# Patient Record
Sex: MALE | Race: WHITE | NOT HISPANIC OR LATINO | Employment: OTHER | URBAN - METROPOLITAN AREA
[De-identification: names, ages, dates, MRNs, and addresses within clinical notes are randomized per-mention and may not be internally consistent; named-entity substitution may affect disease eponyms.]

---

## 2017-05-30 ENCOUNTER — TELEPHONE (OUTPATIENT)
Dept: PULMONOLOGY | Facility: CLINIC | Age: 67
End: 2017-05-30

## 2017-05-30 NOTE — TELEPHONE ENCOUNTER
Spoke with Frederic with [facility], notified him that pt has not been seen in our clinic. He verbalized understanding.

## 2017-05-30 NOTE — TELEPHONE ENCOUNTER
----- Message from Yareli Rivera sent at 5/30/2017  9:40 AM CDT -----  Contact: Frederic/[facility] [phone] ext [phone]  States that he is calling to add to previous message. States that Doctor's note needs to have stated that pt is unable to have a traditional sleep study that he has to have a titration due to a obstruction to trachea. Please call back at [phone] ext [phone]//thank you acc

## 2017-05-30 NOTE — TELEPHONE ENCOUNTER
----- Message from Aleshia Thomas sent at 5/30/2017  9:32 AM CDT -----  Contact: [employer]   Caller states that pt want to get a full face mask and need a script faxed over for new machine with pressure setting, full face mask & clinical notes ...[phone] fax   ...[phone] phone

## 2017-10-17 ENCOUNTER — IMMUNIZATION (OUTPATIENT)
Dept: FAMILY MEDICINE | Facility: CLINIC | Age: 67
End: 2017-10-17
Payer: MEDICARE

## 2017-10-17 ENCOUNTER — TELEPHONE (OUTPATIENT)
Dept: FAMILY MEDICINE | Facility: CLINIC | Age: 67
End: 2017-10-17

## 2017-10-17 PROCEDURE — G0008 ADMIN INFLUENZA VIRUS VAC: HCPCS | Mod: S$GLB,,, | Performed by: FAMILY MEDICINE

## 2017-10-17 PROCEDURE — 99999 PR PBB SHADOW E&M-EST. PATIENT-LVL II: CPT | Mod: PBBFAC,,,

## 2017-10-17 PROCEDURE — 90662 IIV NO PRSV INCREASED AG IM: CPT | Mod: S$GLB,,, | Performed by: FAMILY MEDICINE

## 2017-10-17 NOTE — TELEPHONE ENCOUNTER
Allergies reviewed with patient  Pt instructed to wait 15 min for side effect  Left deltoid flu shot

## 2018-11-12 ENCOUNTER — OFFICE VISIT (OUTPATIENT)
Dept: OTOLARYNGOLOGY | Facility: CLINIC | Age: 68
End: 2018-11-12
Payer: MEDICARE

## 2018-11-12 ENCOUNTER — PATIENT MESSAGE (OUTPATIENT)
Dept: OTOLARYNGOLOGY | Facility: CLINIC | Age: 68
End: 2018-11-12

## 2018-11-12 VITALS
BODY MASS INDEX: 33.15 KG/M2 | TEMPERATURE: 99 F | WEIGHT: 258.19 LBS | SYSTOLIC BLOOD PRESSURE: 129 MMHG | HEART RATE: 79 BPM | DIASTOLIC BLOOD PRESSURE: 74 MMHG

## 2018-11-12 DIAGNOSIS — G47.33 OSA ON CPAP: ICD-10-CM

## 2018-11-12 DIAGNOSIS — H69.93 ETD (EUSTACHIAN TUBE DYSFUNCTION), BILATERAL: ICD-10-CM

## 2018-11-12 DIAGNOSIS — R09.81 NASAL CONGESTION: ICD-10-CM

## 2018-11-12 DIAGNOSIS — J32.9 CHRONIC SINUSITIS, UNSPECIFIED LOCATION: Primary | ICD-10-CM

## 2018-11-12 DIAGNOSIS — H72.91 TYMPANIC MEMBRANE PERFORATION, RIGHT: ICD-10-CM

## 2018-11-12 PROCEDURE — 3078F DIAST BP <80 MM HG: CPT | Mod: CPTII,S$GLB,, | Performed by: ORTHOPAEDIC SURGERY

## 2018-11-12 PROCEDURE — 3074F SYST BP LT 130 MM HG: CPT | Mod: CPTII,S$GLB,, | Performed by: ORTHOPAEDIC SURGERY

## 2018-11-12 PROCEDURE — 99999 PR PBB SHADOW E&M-EST. PATIENT-LVL IV: CPT | Mod: PBBFAC,,, | Performed by: ORTHOPAEDIC SURGERY

## 2018-11-12 PROCEDURE — 99204 OFFICE O/P NEW MOD 45 MIN: CPT | Mod: S$GLB,,, | Performed by: ORTHOPAEDIC SURGERY

## 2018-11-12 RX ORDER — MUPIROCIN 20 MG/G
OINTMENT TOPICAL 2 TIMES DAILY
Qty: 15 G | Refills: 3 | Status: SHIPPED | OUTPATIENT
Start: 2018-11-12 | End: 2018-11-22

## 2018-11-12 RX ORDER — MELOXICAM 15 MG/1
15 TABLET ORAL DAILY
COMMUNITY

## 2018-11-12 NOTE — PROGRESS NOTES
"Subjective:       Patient ID: Jeb Michele is a 68 y.o. male.    Chief Complaint: Sinusitis (sinus infection and nose bleed from left side of nostril [id number].)    Patient is a very pleasant 68 year old gentleman here to see me today for the first time for evaluation of his right ear.  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 Acuna.  He has recently noted increased tinnitus and cracking in his ears.  He denies any ear pain or ear drainage.  He has hearing loss, no recent change in hearing.  He has not had any recent change in his hearing.  Following the encephalocele repair he required multiple sets of PET's. He says that since 2015 he has had a "constant sinus infection."  He says that he is constantly blowing thick yellow and bloody nasal drainage.  He has been on antibiotics at least four to five times over the last year with his PCP, he has not seen ENT in several years.  He has not had any recent imaging.  He does not smoke.  He is not currently using any Flonase or nasal irrigations.  He says that he was on Flonase many years ago, but there was concern that it was elevating his BG, so they stopped.  He also has issues with spontaneous nose bleeds, several times over the last two weeks.  He does wear a CPAP at night, not currently humidified.      Review of Systems   Constitutional: Negative for fatigue, fever and unexpected weight change.   HENT: Positive for congestion, hearing loss, postnasal drip, rhinorrhea, sinus pressure and tinnitus. Negative for ear discharge, ear pain, facial swelling, nosebleeds, sneezing, sore throat, trouble swallowing and voice change.    Eyes: Negative for discharge, redness and itching.   Respiratory: Positive for cough. Negative for choking, shortness of breath and wheezing.    Cardiovascular: Negative for chest pain and palpitations.   Gastrointestinal: Negative for abdominal pain.        No reflux.   Musculoskeletal: Negative " for neck pain.   Allergic/Immunologic: Positive for environmental allergies.   Neurological: Positive for headaches. Negative for dizziness, facial asymmetry and light-headedness.   Hematological: Negative for adenopathy. Does not bruise/bleed easily.   Psychiatric/Behavioral: Negative for agitation, behavioral problems, confusion and decreased concentration.       Objective:      Physical Exam   Constitutional: He is oriented to person, place, and time. Vital signs are normal. He appears well-developed and well-nourished. No distress.   HENT:   Head: Normocephalic and atraumatic.   Right Ear: Hearing, external ear and ear canal normal. Tympanic membrane is perforated. No middle ear effusion.   Left Ear: Hearing, external ear and ear canal normal. Tympanic membrane is scarred and retracted.  No middle ear effusion.   Nose: Mucosal edema and rhinorrhea present. No nasal deformity or septal deviation.   Mouth/Throat: Uvula is midline, oropharynx is clear and moist and mucous membranes are normal. No trismus in the jaw. Normal dentition. No uvula swelling (previous UP3). No oropharyngeal exudate or posterior oropharyngeal edema.   Eyes: Conjunctivae and EOM are normal. Pupils are equal, round, and reactive to light. Right eye exhibits no chemosis. Left eye exhibits no chemosis. Right conjunctiva is not injected. Left conjunctiva is not injected. No scleral icterus.   Neck: Trachea normal and phonation normal. No tracheal tenderness present. No tracheal deviation present. No thyroid mass and no thyromegaly present.   Cardiovascular: Intact distal pulses.   Pulmonary/Chest: Effort normal. No accessory muscle usage or stridor. No respiratory distress.   Lymphadenopathy:        Head (right side): No submental, no submandibular, no preauricular and no posterior auricular adenopathy present.        Head (left side): No submental, no submandibular, no preauricular and no posterior auricular adenopathy present.     He has no  cervical adenopathy.        Right cervical: No superficial cervical and no deep cervical adenopathy present.       Left cervical: No superficial cervical and no deep cervical adenopathy present.   Neurological: He is alert and oriented to person, place, and time. No cranial nerve deficit.   Skin: Skin is warm and dry. No rash noted. No erythema.   Psychiatric: He has a normal mood and affect. His behavior is normal. Thought content normal.       Assessment:       1. Chronic sinusitis, unspecified location    2. MARJORIE on CPAP    3. Nasal congestion    4. Tympanic membrane perforation, right    5. ETD (Eustachian tube dysfunction), bilateral        Plan:       1.  Chronic sinusitis:  He does have signs and symptoms of chronic sinusitis, and does have a previous history of nasal antral windows placed in sinus surgery. At this point, I would recommend a CT scan of his sinuses prior to initiating any additional antibiotic therapy.  Will schedule CT, and have him return to clinic following to review.  2.  MARJORIE on CPAP:  Discussed importance of humidification to help decrease his nasal drainage.  I would recommend he begin to use the the humidification feature of the CPAP, and also use saline spray during the day.  3.  Nasal congestion:  As above.  May consider read trying Flonase in the future, he reported a transient increase in his blood glucose with the use of Flonase, though that is not a generally seen side effect.  4.  Tympanic membrane perforation, right:  Stable at this time, with no active drainage.  I would not recommend any intervention as he has required multiple sets of ear tubes.

## 2018-11-13 ENCOUNTER — HOSPITAL ENCOUNTER (OUTPATIENT)
Dept: RADIOLOGY | Facility: HOSPITAL | Age: 68
Discharge: HOME | End: 2018-11-13
Attending: ORTHOPAEDIC SURGERY
Payer: MEDICARE

## 2018-11-13 ENCOUNTER — PATIENT MESSAGE (OUTPATIENT)
Dept: OTOLARYNGOLOGY | Facility: CLINIC | Age: 68
End: 2018-11-13

## 2018-11-13 DIAGNOSIS — J32.9 CHRONIC SINUSITIS, UNSPECIFIED LOCATION: ICD-10-CM

## 2018-11-13 PROCEDURE — 70486 CT MAXILLOFACIAL W/O DYE: CPT | Mod: TC

## 2018-11-14 ENCOUNTER — OFFICE VISIT (OUTPATIENT)
Dept: OTOLARYNGOLOGY | Facility: CLINIC | Age: 68
End: 2018-11-14
Payer: MEDICARE

## 2018-11-14 VITALS
DIASTOLIC BLOOD PRESSURE: 77 MMHG | HEART RATE: 74 BPM | HEIGHT: 74 IN | SYSTOLIC BLOOD PRESSURE: 165 MMHG | BODY MASS INDEX: 33.1 KG/M2 | WEIGHT: 257.94 LBS

## 2018-11-14 DIAGNOSIS — G47.33 OSA ON CPAP: ICD-10-CM

## 2018-11-14 DIAGNOSIS — R04.0 EPISTAXIS: Primary | ICD-10-CM

## 2018-11-14 DIAGNOSIS — J32.9 CHRONIC SINUSITIS, UNSPECIFIED LOCATION: ICD-10-CM

## 2018-11-14 DIAGNOSIS — I10 HYPERTENSION, UNSPECIFIED TYPE: ICD-10-CM

## 2018-11-14 PROCEDURE — 1101F PT FALLS ASSESS-DOCD LE1/YR: CPT | Mod: CPTII,S$GLB,, | Performed by: ORTHOPAEDIC SURGERY

## 2018-11-14 PROCEDURE — 3077F SYST BP >= 140 MM HG: CPT | Mod: CPTII,S$GLB,, | Performed by: ORTHOPAEDIC SURGERY

## 2018-11-14 PROCEDURE — 99214 OFFICE O/P EST MOD 30 MIN: CPT | Mod: 25,S$GLB,, | Performed by: ORTHOPAEDIC SURGERY

## 2018-11-14 PROCEDURE — 3078F DIAST BP <80 MM HG: CPT | Mod: CPTII,S$GLB,, | Performed by: ORTHOPAEDIC SURGERY

## 2018-11-14 PROCEDURE — 99999 PR PBB SHADOW E&M-EST. PATIENT-LVL IV: CPT | Mod: PBBFAC,,, | Performed by: ORTHOPAEDIC SURGERY

## 2018-11-14 PROCEDURE — 31237 NSL/SINS NDSC SURG BX POLYPC: CPT | Mod: LT,S$GLB,, | Performed by: ORTHOPAEDIC SURGERY

## 2018-11-14 RX ORDER — DOCUSATE SODIUM 100 MG/1
100 CAPSULE, LIQUID FILLED ORAL
COMMUNITY
End: 2019-02-25

## 2018-11-14 RX ORDER — AMLODIPINE BESYLATE 5 MG/1
5 TABLET ORAL DAILY
Refills: 3 | COMMUNITY
Start: 2018-10-19

## 2018-11-14 RX ORDER — CEFDINIR 300 MG/1
300 CAPSULE ORAL 2 TIMES DAILY
Qty: 20 CAPSULE | Refills: 0 | Status: SHIPPED | OUTPATIENT
Start: 2018-11-14 | End: 2018-11-24

## 2018-11-14 RX ORDER — TAMSULOSIN HYDROCHLORIDE 0.4 MG/1
0.4 CAPSULE ORAL DAILY
COMMUNITY
Start: 2018-09-05

## 2018-11-14 RX ORDER — QUINAPRIL 20 MG/1
20 TABLET ORAL 2 TIMES DAILY
COMMUNITY
Start: 2016-08-18

## 2018-11-14 NOTE — PROGRESS NOTES
Subjective:       Patient ID: Jeb Michele is a 68 y.o. male.    Chief Complaint: Other (nose bleed seen in ER last night)    Patient is a very pleasant 68 year old gentleman here to see me today in followup for evaluation of sinusitis.  He had a CT of the sinuses yesterday, and then went home and had a significant issue with epistaxis.  He was seen at [facility] ER, and his bleeding generally stopped with pressure and Afrin.  He is on ASA daily as a preventative medication.  He wears CPAP at night, but he did not wear last night.  He has hypertension, but his BP is generally fairly well controlled.  At his last visit, we discussed using saline spray to help increase his nasal moisture.  He is not currently on Flonase, he says that he tried to take in the past but he stopped as there was concern it was elevating his BG slightly.  I saw him on Monday, and he did use humidification that night with his CPAP, and reports that he slept better than he usually does.      Review of Systems   Constitutional: Negative for fatigue, fever and unexpected weight change.   HENT: Positive for congestion, hearing loss, postnasal drip, rhinorrhea, sinus pressure and tinnitus. Negative for ear discharge, ear pain, facial swelling, nosebleeds, sneezing, sore throat, trouble swallowing and voice change.    Eyes: Negative for discharge, redness and itching.   Respiratory: Positive for cough. Negative for choking, shortness of breath and wheezing.    Cardiovascular: Negative for chest pain and palpitations.   Gastrointestinal: Negative for abdominal pain.        No reflux.   Musculoskeletal: Negative for neck pain.   Allergic/Immunologic: Positive for environmental allergies.   Neurological: Positive for headaches. Negative for dizziness, facial asymmetry and light-headedness.   Hematological: Negative for adenopathy. Does not bruise/bleed easily.   Psychiatric/Behavioral: Negative for agitation, behavioral problems, confusion and  decreased concentration.       Objective:      Physical Exam   Constitutional: He is oriented to person, place, and time. Vital signs are normal. He appears well-developed and well-nourished. No distress.   HENT:   Head: Normocephalic and atraumatic.   Right Ear: Hearing, external ear and ear canal normal. Tympanic membrane is perforated. No middle ear effusion.   Left Ear: Hearing, external ear and ear canal normal. Tympanic membrane is scarred and retracted.  No middle ear effusion.   Nose: Nose normal. No mucosal edema, rhinorrhea, nasal deformity or septal deviation.   Mouth/Throat: Uvula is midline, oropharynx is clear and moist and mucous membranes are normal. No trismus in the jaw. Normal dentition. No uvula swelling. No oropharyngeal exudate or posterior oropharyngeal edema.   Eyes: Conjunctivae and EOM are normal. Pupils are equal, round, and reactive to light. Right eye exhibits no chemosis. Left eye exhibits no chemosis. Right conjunctiva is not injected. Left conjunctiva is not injected. No scleral icterus.   Neck: Trachea normal and phonation normal. No tracheal tenderness present. No tracheal deviation present. No thyroid mass and no thyromegaly present.   Cardiovascular: Intact distal pulses.   Pulmonary/Chest: Effort normal. No accessory muscle usage or stridor. No respiratory distress.   Lymphadenopathy:        Head (right side): No submental, no submandibular, no preauricular and no posterior auricular adenopathy present.        Head (left side): No submental, no submandibular, no preauricular and no posterior auricular adenopathy present.     He has no cervical adenopathy.        Right cervical: No superficial cervical and no deep cervical adenopathy present.       Left cervical: No superficial cervical and no deep cervical adenopathy present.   Neurological: He is alert and oriented to person, place, and time. No cranial nerve deficit.   Skin: Skin is warm and dry. No rash noted. No erythema.    Psychiatric: He has a normal mood and affect. His behavior is normal. Thought content normal.       PROCEDURE NOTE:  Nasal endoscopy and debridement  Preprocedure diagnosis:  Chronic sinusitis  Postprocedure diangosis:  Same  Complications:  None  Blood Loss:  None    Procedure in detail:  After verbal consent was obtained, the patient's nasal cavity was anesthesized using topical Tetracaine and Neosynepherine.  A rigid 30 degree endoscope was placed in first the right, then the left nasal cavity.  The inferior and middle turbinates were examined, and found to be normal bilaterally.  The middle meatus and maxillary antrum was also examined, and found to be normal bilaterally.  He had bilateral nasal-antral windows, widely patent on the right with no drainage.  On the left, I was unable to fully pass the suction into his maxillary sinus, but I was able to suction copious purulent debris from the narrow opening.  Silver nitrate applied to the head of his left middle turbinate and the left nasal septum.  The patient tolerated the procedure well and there were no complications.      CT SINUS:  Images and report both reviewed with patient.    1. Chronic bilateral maxillary sinusitis as detailed above.  2. Slight rightward nasal septal deviation.  3. Gem bullosa of the left middle turbinates.  4. Mucosal hypertrophy of the left inferior turbinate.  All CT scans at this facility are performed  using dose modulation techniques as appropriate to performed exam including the following:  automated exposure control; adjustment of mA and/or kV according to the patients size (this includes techniques or standardized protocols for targeted exams where dose is matched to indication/reason for exam: i.e. extremities or head);  iterative reconstruction technique.      Assessment:       1. Epistaxis    2. Hypertension, unspecified type    3. MARJORIE on CPAP    4. Chronic sinusitis, unspecified location        Plan:       1.   Epistaxis:  On examination today he did have some active bleeding which was cauterized using silver nitrate, as well as an acute sinusitis of the left maxillary sinus.  I would recommend he continue with Bactroban ointment to his bilateral nostrils twice daily as well as saline spray during the day.  Okay to use Afrin as needed for active bleeding.  2.  Hypertension:  Continue with aggressive blood pressure management, discussed correlation between epistaxis and elevated blood pressure.  3.  MARJORIE on CPAP:  Resume use of CPAP machine with humidification.  Call if this triggers any further epistaxis.  4.  Chronic sinusitis:  Reviewed CT scan as well as today's exam findings with patient, patient's started on oral Omnicef.  He does have a history of penicillin allergy, which he said he was told from his mother as a young child, he will call if he has any side effects from that medication.  Return to clinic in 1 month to review progress.  At that point, may consider a repeat trial of Flonase as that likely would help with many of his symptoms, that we need for his epistaxis to be resolved prior to starting that medication.

## 2018-11-14 NOTE — TELEPHONE ENCOUNTER
Patient sent you a message on Monday, 11/12/18, which was forwarded to you by Karina.  I have copied it below.  Please respond to the patient via [facility].  Thanks.    Jeb Michele   to Yareli Fitzgerald MD           5:45 PM   The paper I got with this  mupirocin said it was not to be used intranasal. It said there was another ointment, mupirocin calcium nasal ointment. I understood you to say that it was to be used intranasal, is that correct?

## 2018-11-16 ENCOUNTER — TELEPHONE (OUTPATIENT)
Dept: OTOLARYNGOLOGY | Facility: CLINIC | Age: 68
End: 2018-11-16

## 2018-11-16 RX ORDER — LEVOFLOXACIN 500 MG/1
500 TABLET, FILM COATED ORAL DAILY
Qty: 14 TABLET | Refills: 0 | Status: SHIPPED | OUTPATIENT
Start: 2018-11-16 | End: 2018-11-30

## 2018-11-16 NOTE — TELEPHONE ENCOUNTER
Spoke with pt and is having dizziness and diarrhea from the antibiotic and is wondering if there is an alternative that can be sent in.

## 2018-11-16 NOTE — TELEPHONE ENCOUNTER
----- Message from Carolyn Aguirre sent at 11/16/2018  8:00 AM CST -----  states that he's having reaction to cefdinir (dizziness & diarrhea). pls adv...[phone]

## 2018-11-16 NOTE — TELEPHONE ENCOUNTER
Spoke with pt and advised him to stop the Cefdinir and begin the Levaquin.  Pt verbalized understanding.

## 2018-11-26 ENCOUNTER — OFFICE VISIT (OUTPATIENT)
Dept: OTOLARYNGOLOGY | Facility: CLINIC | Age: 68
End: 2018-11-26
Payer: MEDICARE

## 2018-11-26 VITALS
TEMPERATURE: 98 F | HEIGHT: 74 IN | SYSTOLIC BLOOD PRESSURE: 118 MMHG | DIASTOLIC BLOOD PRESSURE: 72 MMHG | BODY MASS INDEX: 33.13 KG/M2 | WEIGHT: 258.19 LBS | HEART RATE: 75 BPM

## 2018-11-26 DIAGNOSIS — H72.91 TYMPANIC MEMBRANE PERFORATION, RIGHT: ICD-10-CM

## 2018-11-26 DIAGNOSIS — G47.33 OSA ON CPAP: ICD-10-CM

## 2018-11-26 DIAGNOSIS — R04.0 EPISTAXIS: Primary | ICD-10-CM

## 2018-11-26 DIAGNOSIS — J32.9 CHRONIC SINUSITIS, UNSPECIFIED LOCATION: ICD-10-CM

## 2018-11-26 PROCEDURE — 30901 CONTROL OF NOSEBLEED: CPT | Mod: LT,S$GLB,, | Performed by: ORTHOPAEDIC SURGERY

## 2018-11-26 PROCEDURE — 3074F SYST BP LT 130 MM HG: CPT | Mod: CPTII,S$GLB,, | Performed by: ORTHOPAEDIC SURGERY

## 2018-11-26 PROCEDURE — 3078F DIAST BP <80 MM HG: CPT | Mod: CPTII,S$GLB,, | Performed by: ORTHOPAEDIC SURGERY

## 2018-11-26 PROCEDURE — 99999 PR PBB SHADOW E&M-EST. PATIENT-LVL IV: CPT | Mod: PBBFAC,,, | Performed by: ORTHOPAEDIC SURGERY

## 2018-11-26 PROCEDURE — 99214 OFFICE O/P EST MOD 30 MIN: CPT | Mod: 25,S$GLB,, | Performed by: ORTHOPAEDIC SURGERY

## 2018-11-26 PROCEDURE — 1101F PT FALLS ASSESS-DOCD LE1/YR: CPT | Mod: CPTII,S$GLB,, | Performed by: ORTHOPAEDIC SURGERY

## 2018-11-26 NOTE — PROGRESS NOTES
Subjective:       Patient ID: Jeb Michele is a 68 y.o. male.    Chief Complaint: Follow-up (2wk)    Patient is a very pleasant 68 year old gentleman here to see me today in followup for evaluation of sinusitis and epistaxis.  He has a remote history of sinus surgery. He also does wear a CPAP at night, and has recently added humidification.  At his last visit, his scope examination was consistent with an acute sinusitis I started him on oral Levaquin which did not tolerate and was then adjusted to Omnicef.  Overall, he feels that he has had a significant improvement in his symptoms, is extremely pleased with his progress.  He feels that he is sleeping much better at night with the addition of humidification to his CPAP, and is not waking up with dry mouth.  He did have 1 episode of epistaxis this morning from his left nostril, his 1st since his last visit with us.  He has hypertension, but his BP is generally fairly well controlled.  He is not currently on Flonase, he says that he tried to take in the past but he stopped as there was concern it was elevating his BG slightly.      Review of Systems   Constitutional: Negative for fatigue, fever and unexpected weight change.   HENT: Positive for congestion, hearing loss, postnasal drip and tinnitus. Negative for ear discharge, ear pain, facial swelling, nosebleeds, rhinorrhea, sinus pressure, sneezing, sore throat, trouble swallowing and voice change.    Eyes: Negative for discharge, redness and itching.   Respiratory: Positive for cough. Negative for choking, shortness of breath and wheezing.    Cardiovascular: Negative for chest pain and palpitations.   Gastrointestinal: Negative for abdominal pain.        No reflux.   Musculoskeletal: Negative for neck pain.   Allergic/Immunologic: Positive for environmental allergies.   Neurological: Positive for headaches. Negative for dizziness, facial asymmetry and light-headedness.   Hematological: Negative for adenopathy.  Does not bruise/bleed easily.   Psychiatric/Behavioral: Negative for agitation, behavioral problems, confusion and decreased concentration.       Objective:      Physical Exam   Constitutional: He is oriented to person, place, and time. Vital signs are normal. He appears well-developed and well-nourished. No distress.   HENT:   Head: Normocephalic and atraumatic.   Right Ear: Hearing, external ear and ear canal normal. Tympanic membrane is perforated. No middle ear effusion.   Left Ear: Hearing, external ear and ear canal normal. Tympanic membrane is scarred and retracted.  No middle ear effusion.   Nose: Nose normal. No mucosal edema, rhinorrhea, nasal deformity or septal deviation.   Mouth/Throat: Uvula is midline, oropharynx is clear and moist and mucous membranes are normal. No trismus in the jaw. Normal dentition. No uvula swelling. No oropharyngeal exudate or posterior oropharyngeal edema.   Active bleeding left anterior nasal septum, cauterization described below   Eyes: Conjunctivae and EOM are normal. Pupils are equal, round, and reactive to light. Right eye exhibits no chemosis. Left eye exhibits no chemosis. Right conjunctiva is not injected. Left conjunctiva is not injected. No scleral icterus.   Neck: Trachea normal and phonation normal. No tracheal tenderness present. No tracheal deviation present. No thyroid mass and no thyromegaly present.   Cardiovascular: Intact distal pulses.   Pulmonary/Chest: Effort normal. No accessory muscle usage or stridor. No respiratory distress.   Lymphadenopathy:        Head (right side): No submental, no submandibular, no preauricular and no posterior auricular adenopathy present.        Head (left side): No submental, no submandibular, no preauricular and no posterior auricular adenopathy present.     He has no cervical adenopathy.        Right cervical: No superficial cervical and no deep cervical adenopathy present.       Left cervical: No superficial cervical and no  deep cervical adenopathy present.   Neurological: He is alert and oriented to person, place, and time. No cranial nerve deficit.   Skin: Skin is warm and dry. No rash noted. No erythema.   Psychiatric: He has a normal mood and affect. His behavior is normal. Thought content normal.       PROCEDURE NOTE:  Control of Anterior Epistaxis  Preprocedure diagnosis:  Recurrent epistaxis  Postprocedure diangosis:  Same  Complications:  None  Blood Loss:  Minimal    Procedure in detail:  After verbal consent was obtained, the patient's nasal cavity was anesthesized using topical Ponticaine.  Upon examination, the patient had ectatic vessels on his anterior septum, on the left side.  Under direct visualization with a head lamp and a nasal speculum, a silver nitrate stick was appiled to his left anterior septum.  A minimal amount of bleeding was noted.  The patient tolerated the procedure well, and there were no significant complications.          Assessment:       1. Epistaxis    2. MARJORIE on CPAP    3. Chronic sinusitis, unspecified location    4. Tympanic membrane perforation, right        Plan:       1.  Epistaxis:  Repeat cautery today, overall significant improvement in the frequency of his episodes from previously.  Continue with the ointment and saline spray as needed.  2.  MARJORIE on CPAP:  He has noted a significant improvement in his symptoms adding humidification to his CPAP machine.  3.  Chronic sinusitis:  Now resolved with oral Omnicef.  4.  Right tympanic membrane perforation:  We again reviewed that he does have a perforation of his right tympanic membrane, with his history of longstanding eustachian tube dysfunction is actually functioning as a tube at this point.  He does not have any recent audiograms, and I would recommend a baseline audiogram on his return visit in 6 months.  Will call for sooner appointment if he notes any issues in the interim.

## 2019-02-25 ENCOUNTER — OFFICE VISIT (OUTPATIENT)
Dept: OTOLARYNGOLOGY | Facility: CLINIC | Age: 69
End: 2019-02-25
Payer: MEDICARE

## 2019-02-25 VITALS
HEART RATE: 80 BPM | BODY MASS INDEX: 33.17 KG/M2 | DIASTOLIC BLOOD PRESSURE: 66 MMHG | WEIGHT: 258.38 LBS | TEMPERATURE: 98 F | SYSTOLIC BLOOD PRESSURE: 127 MMHG

## 2019-02-25 DIAGNOSIS — H72.91 TYMPANIC MEMBRANE PERFORATION, RIGHT: ICD-10-CM

## 2019-02-25 DIAGNOSIS — G47.33 OSA ON CPAP: ICD-10-CM

## 2019-02-25 DIAGNOSIS — J32.9 CHRONIC SINUSITIS, UNSPECIFIED LOCATION: ICD-10-CM

## 2019-02-25 DIAGNOSIS — R04.0 EPISTAXIS: Primary | ICD-10-CM

## 2019-02-25 PROCEDURE — 30901 PR CTRL 2SEBLEED,ANTER,SIMPLE: ICD-10-PCS | Mod: LT,S$GLB,, | Performed by: ORTHOPAEDIC SURGERY

## 2019-02-25 PROCEDURE — 99214 OFFICE O/P EST MOD 30 MIN: CPT | Mod: 25,S$GLB,, | Performed by: ORTHOPAEDIC SURGERY

## 2019-02-25 PROCEDURE — 1101F PT FALLS ASSESS-DOCD LE1/YR: CPT | Mod: CPTII,S$GLB,, | Performed by: ORTHOPAEDIC SURGERY

## 2019-02-25 PROCEDURE — 99214 PR OFFICE/OUTPT VISIT, EST, LEVL IV, 30-39 MIN: ICD-10-PCS | Mod: 25,S$GLB,, | Performed by: ORTHOPAEDIC SURGERY

## 2019-02-25 PROCEDURE — 30901 CONTROL OF NOSEBLEED: CPT | Mod: LT,S$GLB,, | Performed by: ORTHOPAEDIC SURGERY

## 2019-02-25 PROCEDURE — 1101F PR PT FALLS ASSESS DOC 0-1 FALLS W/OUT INJ PAST YR: ICD-10-PCS | Mod: CPTII,S$GLB,, | Performed by: ORTHOPAEDIC SURGERY

## 2019-02-25 PROCEDURE — 99999 PR PBB SHADOW E&M-EST. PATIENT-LVL III: ICD-10-PCS | Mod: PBBFAC,,, | Performed by: ORTHOPAEDIC SURGERY

## 2019-02-25 PROCEDURE — 99999 PR PBB SHADOW E&M-EST. PATIENT-LVL III: CPT | Mod: PBBFAC,,, | Performed by: ORTHOPAEDIC SURGERY

## 2019-02-25 RX ORDER — HYDROCHLOROTHIAZIDE 12.5 MG/1
12.5 CAPSULE ORAL DAILY
COMMUNITY
Start: 2019-02-16 | End: 2019-02-25 | Stop reason: SDUPTHER

## 2019-02-25 RX ORDER — CEFDINIR 300 MG/1
300 CAPSULE ORAL 2 TIMES DAILY
Qty: 20 CAPSULE | Refills: 0 | Status: SHIPPED | OUTPATIENT
Start: 2019-02-25 | End: 2019-03-07

## 2019-02-25 RX ORDER — MUPIROCIN 20 MG/G
OINTMENT TOPICAL 2 TIMES DAILY
Qty: 15 G | Refills: 3 | Status: SHIPPED | OUTPATIENT
Start: 2019-02-25 | End: 2020-01-09

## 2019-02-25 NOTE — PROGRESS NOTES
Subjective:       Patient ID: Jeb Michele is a 68 y.o. male.    Chief Complaint: Sinusitis; Sinus Problem; Other (left side of face swollen for 2 weeks); and Epistaxis (severe for 2 days; last nosebleed 2/24/2019 at 1pm)    Patient is very pleasant 68 year old gentleman here to see me today in followup for evaluation of persistent epistaxis.  He says that the left side is bleeding more than the right, and he has had some facial swelling of the left side of his face.  He generally feels run down.  He has thick green nasal drainage from the right side of his nose.  He has just completed a course of antibiotics with his PCP, azithromycin.  He has had a CT showing mild to moderate chronic sinusitis, no active infection.  He is ASA daily, 81 mg.  He is on CPAP, it is humidified.      Review of Systems   Constitutional: Positive for fatigue. Negative for fever and unexpected weight change.   HENT: Positive for congestion, postnasal drip, rhinorrhea and sinus pressure. Negative for ear discharge, ear pain, facial swelling, hearing loss, nosebleeds, sneezing, sore throat, tinnitus, trouble swallowing and voice change.    Eyes: Negative for discharge, redness and itching.   Respiratory: Negative for cough, choking, shortness of breath and wheezing.    Cardiovascular: Negative for chest pain and palpitations.   Gastrointestinal: Negative for abdominal pain.        No reflux.   Musculoskeletal: Negative for neck pain.   Allergic/Immunologic: Positive for environmental allergies.   Neurological: Positive for light-headedness and headaches. Negative for dizziness and facial asymmetry.   Hematological: Negative for adenopathy. Does not bruise/bleed easily.   Psychiatric/Behavioral: Negative for agitation, behavioral problems, confusion and decreased concentration.       Objective:      Physical Exam   Constitutional: He is oriented to person, place, and time. Vital signs are normal. He appears well-developed and  well-nourished. No distress.   HENT:   Head: Normocephalic and atraumatic.   Right Ear: Hearing, external ear and ear canal normal. Tympanic membrane is perforated. No middle ear effusion.   Left Ear: Hearing, external ear and ear canal normal. Tympanic membrane is scarred and retracted.  No middle ear effusion.   Nose: Mucosal edema and rhinorrhea (thick, dry drainage bilaterally) present. No nasal deformity or septal deviation.   Mouth/Throat: Uvula is midline, oropharynx is clear and moist and mucous membranes are normal. No trismus in the jaw. Normal dentition. No uvula swelling. No oropharyngeal exudate or posterior oropharyngeal edema.   Ectatic vessel left anterior nasal septum, cauterization described below   Eyes: Conjunctivae and EOM are normal. Pupils are equal, round, and reactive to light. Right eye exhibits no chemosis. Left eye exhibits no chemosis. Right conjunctiva is not injected. Left conjunctiva is not injected. No scleral icterus.   Neck: Trachea normal and phonation normal. No tracheal tenderness present. No tracheal deviation present. No thyroid mass and no thyromegaly present.   Cardiovascular: Intact distal pulses.   Pulmonary/Chest: Effort normal. No accessory muscle usage or stridor. No respiratory distress.   Lymphadenopathy:        Head (right side): No submental, no submandibular, no preauricular and no posterior auricular adenopathy present.        Head (left side): No submental, no submandibular, no preauricular and no posterior auricular adenopathy present.     He has no cervical adenopathy.        Right cervical: No superficial cervical and no deep cervical adenopathy present.       Left cervical: No superficial cervical and no deep cervical adenopathy present.   Neurological: He is alert and oriented to person, place, and time. No cranial nerve deficit.   Skin: Skin is warm and dry. No rash noted. No erythema.   Psychiatric: He has a normal mood and affect. His behavior is normal.  Thought content normal.       PROCEDURE NOTE:  Control of Anterior Epistaxis  Preprocedure diagnosis:  Recurrent epistaxis  Postprocedure diangosis:  Same  Complications:  None  Blood Loss:  Minimal    Procedure in detail:  After verbal consent was obtained, the patient's nasal cavity was examined with a headlight.  Upon examination, the patient had ectatic vessels on his anterior septum, on the left side.  Under direct visualization with a head lamp and a nasal speculum, a silver nitrate stick was appiled to his left anterior septum.  A minimal amount of bleeding was noted.  The patient tolerated the procedure well, and there were no significant complications.          Assessment:       1. Epistaxis    2. MARJORIE on CPAP    3. Chronic sinusitis, unspecified location    4. Tympanic membrane perforation, right        Plan:       1.  Epistaxis:  He did quite well while he was using his topical mupirocin, and feels that his symptoms return since stopping that medication.  Refill sent and that medication.  If he needs to continue daily if that helps to avoid the need for oral antibiotics, but certainly is a reasonable management plan.  Otherwise, he can try and substitute Vaseline or Neosporin.  2.  MARJORIE on CPAP:  Continue with humidification, can certainly increased risk of epistaxis but should still be continued.  3.  Chronic sinusitis:  He noted improvement with a previous course of Omnicef, will repeat at this time. However, discussed with patient the importance of avoiding the need for oral antibiotics as much as possible, continue with mupirocin as above.  4.  Right tympanic membrane perforation:  Stable and dry.  Would not recommend repair at this time due to significant ETD appreciated in left ear.

## 2019-05-27 ENCOUNTER — OFFICE VISIT (OUTPATIENT)
Dept: OTOLARYNGOLOGY | Facility: CLINIC | Age: 69
End: 2019-05-27
Payer: MEDICARE

## 2019-05-27 ENCOUNTER — CLINICAL SUPPORT (OUTPATIENT)
Dept: AUDIOLOGY | Facility: CLINIC | Age: 69
End: 2019-05-27
Payer: MEDICARE

## 2019-05-27 VITALS
TEMPERATURE: 97 F | DIASTOLIC BLOOD PRESSURE: 68 MMHG | WEIGHT: 260.13 LBS | HEART RATE: 71 BPM | SYSTOLIC BLOOD PRESSURE: 138 MMHG | BODY MASS INDEX: 33.4 KG/M2

## 2019-05-27 DIAGNOSIS — H90.A31 MIXED CONDUCTIVE AND SENSORINEURAL HEARING LOSS OF RIGHT EAR WITH RESTRICTED HEARING OF LEFT EAR: Primary | ICD-10-CM

## 2019-05-27 DIAGNOSIS — H72.91 TYMPANIC MEMBRANE PERFORATION, RIGHT: Primary | ICD-10-CM

## 2019-05-27 DIAGNOSIS — H90.6 MIXED CONDUCTIVE AND SENSORINEURAL HEARING LOSS OF BOTH EARS: ICD-10-CM

## 2019-05-27 DIAGNOSIS — G47.33 OSA ON CPAP: ICD-10-CM

## 2019-05-27 DIAGNOSIS — R04.0 EPISTAXIS: ICD-10-CM

## 2019-05-27 PROCEDURE — 92567 TYMPANOMETRY: CPT | Mod: S$GLB,,, | Performed by: AUDIOLOGIST

## 2019-05-27 PROCEDURE — 99999 PR PBB SHADOW E&M-EST. PATIENT-LVL III: ICD-10-PCS | Mod: PBBFAC,,, | Performed by: ORTHOPAEDIC SURGERY

## 2019-05-27 PROCEDURE — 3078F DIAST BP <80 MM HG: CPT | Mod: CPTII,S$GLB,, | Performed by: ORTHOPAEDIC SURGERY

## 2019-05-27 PROCEDURE — 1101F PT FALLS ASSESS-DOCD LE1/YR: CPT | Mod: CPTII,S$GLB,, | Performed by: ORTHOPAEDIC SURGERY

## 2019-05-27 PROCEDURE — 99999 PR PBB SHADOW E&M-EST. PATIENT-LVL III: CPT | Mod: PBBFAC,,, | Performed by: ORTHOPAEDIC SURGERY

## 2019-05-27 PROCEDURE — 92567 PR TYMPA2METRY: ICD-10-PCS | Mod: S$GLB,,, | Performed by: AUDIOLOGIST

## 2019-05-27 PROCEDURE — 99214 PR OFFICE/OUTPT VISIT, EST, LEVL IV, 30-39 MIN: ICD-10-PCS | Mod: S$GLB,,, | Performed by: ORTHOPAEDIC SURGERY

## 2019-05-27 PROCEDURE — 99214 OFFICE O/P EST MOD 30 MIN: CPT | Mod: S$GLB,,, | Performed by: ORTHOPAEDIC SURGERY

## 2019-05-27 PROCEDURE — 1101F PR PT FALLS ASSESS DOC 0-1 FALLS W/OUT INJ PAST YR: ICD-10-PCS | Mod: CPTII,S$GLB,, | Performed by: ORTHOPAEDIC SURGERY

## 2019-05-27 PROCEDURE — 92557 PR COMPREHENSIVE HEARING TEST: ICD-10-PCS | Mod: S$GLB,,, | Performed by: AUDIOLOGIST

## 2019-05-27 PROCEDURE — 3075F PR MOST RECENT SYSTOLIC BLOOD PRESS GE 130-139MM HG: ICD-10-PCS | Mod: CPTII,S$GLB,, | Performed by: ORTHOPAEDIC SURGERY

## 2019-05-27 PROCEDURE — 3078F PR MOST RECENT DIASTOLIC BLOOD PRESSURE < 80 MM HG: ICD-10-PCS | Mod: CPTII,S$GLB,, | Performed by: ORTHOPAEDIC SURGERY

## 2019-05-27 PROCEDURE — 92557 COMPREHENSIVE HEARING TEST: CPT | Mod: S$GLB,,, | Performed by: AUDIOLOGIST

## 2019-05-27 PROCEDURE — 3075F SYST BP GE 130 - 139MM HG: CPT | Mod: CPTII,S$GLB,, | Performed by: ORTHOPAEDIC SURGERY

## 2019-05-27 NOTE — PROGRESS NOTES
Subjective:       Patient ID: Jeb Michele is a 68 y.o. male.    Chief Complaint: 6 month follow up (Review audiogram)    Patient is a very pleasant 68 year old gentleman here to see me today in followup for evaluation of multiple issues.  Overall, he is very pleased with his progress and says that the last six months are the healthiest his sinuses have been in many years.  He is continuing to use mupirocin to his nose twice daily, and he has not had any facial pain or epistaxis.  He is on CPAP and it is humidified.  He has not required any antibiotics over the last six months.  He also has a known TM perforation, and he has not had any ear pain or ear drainage.  He has continued hearing loss, not bothering him at this time.    Review of Systems   Constitutional: Negative for fatigue, fever and unexpected weight change.   HENT: Positive for hearing loss. Negative for congestion, ear discharge, ear pain, facial swelling, nosebleeds, postnasal drip, rhinorrhea, sinus pressure, sneezing, sore throat, tinnitus, trouble swallowing and voice change.    Eyes: Negative for discharge, redness and itching.   Respiratory: Negative for cough, choking, shortness of breath and wheezing.    Cardiovascular: Negative for chest pain and palpitations.   Gastrointestinal: Negative for abdominal pain.        No reflux.   Musculoskeletal: Negative for neck pain.   Neurological: Negative for dizziness, facial asymmetry, light-headedness and headaches.   Hematological: Negative for adenopathy. Does not bruise/bleed easily.   Psychiatric/Behavioral: Negative for agitation, behavioral problems, confusion and decreased concentration.       Objective:      Physical Exam   Constitutional: He is oriented to person, place, and time. Vital signs are normal. He appears well-developed and well-nourished. No distress.   HENT:   Head: Normocephalic and atraumatic.   Right Ear: Hearing, external ear and ear canal normal. Tympanic membrane is  perforated. No middle ear effusion.   Left Ear: Hearing, external ear and ear canal normal. Tympanic membrane is scarred and retracted.  No middle ear effusion.   Nose: Mucosal edema and rhinorrhea (thick, dry drainage bilaterally) present. No nasal deformity or septal deviation.   Mouth/Throat: Uvula is midline, oropharynx is clear and moist and mucous membranes are normal. Oral lesions: previous UP3. No trismus in the jaw. Normal dentition. No uvula swelling. No oropharyngeal exudate or posterior oropharyngeal edema.   Ectatic vessel left anterior nasal septum, cauterization described below   Eyes: Pupils are equal, round, and reactive to light. Conjunctivae and EOM are normal. Right eye exhibits no chemosis. Left eye exhibits no chemosis. Right conjunctiva is not injected. Left conjunctiva is not injected. No scleral icterus.   Neck: Trachea normal and phonation normal. No tracheal tenderness present. No tracheal deviation present. No thyroid mass and no thyromegaly present.   Cardiovascular: Intact distal pulses.   Pulmonary/Chest: Effort normal. No accessory muscle usage or stridor. No respiratory distress.   Lymphadenopathy:        Head (right side): No submental, no submandibular, no preauricular and no posterior auricular adenopathy present.        Head (left side): No submental, no submandibular, no preauricular and no posterior auricular adenopathy present.     He has no cervical adenopathy.        Right cervical: No superficial cervical and no deep cervical adenopathy present.       Left cervical: No superficial cervical and no deep cervical adenopathy present.   Neurological: He is alert and oriented to person, place, and time. No cranial nerve deficit.   Skin: Skin is warm and dry. No rash noted. No erythema.   Psychiatric: He has a normal mood and affect. His behavior is normal. Thought content normal.       AUDIOGRAM:  Bilateral mixed hearing loss, excellent SDS      Assessment:       1. Tympanic  membrane perforation, right    2. Mixed conductive and sensorineural hearing loss of both ears    3. MARJORIE on CPAP    4. Epistaxis        Plan:       1.  Right TM perforation: Stable, would not recommend repair at this time due to severe underlying ETD and his hesitancy to consider surgery.  Dry ear precautions.  2.  Mixed hearing loss:  We reviewed the patient's recent audiogram and hearing loss in detail.  We also discussed that he is a excellent candidate for hearing aids, if and when he the patient is motivated.  He was given handouts with information and pricing of hearing aids, and will contact audiology when ready to proceed.  We also discussed the use hearing protection when exposed to loud noise, including lawn equipment.   3. MARJORIE on CPAP:  Tolerating much better with topical bactroban, less nasal and mouth dryness.  4.  Epistaxis:  Resolved, continue with bactroban twice daily, RTC 1 year sooner if issues arise.

## 2019-05-27 NOTE — PROGRESS NOTES
Jeb Michele was seen 05/27/2019 for an audiological evaluation and a six month ENT follow up.  Pt reports long term hearing loss bilaterally.  He reports history of chronic Eustachian tube dysfunction, for which a T tube was placed in his right ear.  It has since extruded, leaving a perforation in the tympanic membrane in the right ear.  He reports long term bilateral tinnitus and chirping sounds.  He denies dizziness.    Results reveal a moderate-to-profound mixed hearing loss 250-8000 Hz for the right ear, and  mild-to-moderate conductive hearing loss 250-1000 Hz, and moderate to profound SNHL 2-8khz for the left ear.   Speech Reception Thresholds were  40 dBHL for the right ear and 30 dBHL for the left ear.   Word recognition scores were good for the right ear and excellent for the left ear.   Tympanograms were unable to obtain for the right ear and Type Ad for the left ear.    Patient was counseled on the above findings.    REC:  ENT review of audiogram

## 2020-01-10 RX ORDER — MUPIROCIN 20 MG/G
OINTMENT TOPICAL 2 TIMES DAILY
Qty: 22 G | Refills: 3 | Status: SHIPPED | OUTPATIENT
Start: 2020-01-10 | End: 2020-01-20

## 2020-05-04 ENCOUNTER — PATIENT MESSAGE (OUTPATIENT)
Dept: OTOLARYNGOLOGY | Facility: CLINIC | Age: 70
End: 2020-05-04

## 2020-06-08 ENCOUNTER — CLINICAL SUPPORT (OUTPATIENT)
Dept: AUDIOLOGY | Facility: CLINIC | Age: 70
End: 2020-06-08
Payer: MEDICARE

## 2020-06-08 ENCOUNTER — OFFICE VISIT (OUTPATIENT)
Dept: OTOLARYNGOLOGY | Facility: CLINIC | Age: 70
End: 2020-06-08
Payer: MEDICARE

## 2020-06-08 VITALS
WEIGHT: 257.69 LBS | SYSTOLIC BLOOD PRESSURE: 132 MMHG | BODY MASS INDEX: 33.09 KG/M2 | HEART RATE: 76 BPM | TEMPERATURE: 98 F | DIASTOLIC BLOOD PRESSURE: 65 MMHG

## 2020-06-08 DIAGNOSIS — H72.91 TYMPANIC MEMBRANE PERFORATION, RIGHT: ICD-10-CM

## 2020-06-08 DIAGNOSIS — Q01.8 TEMPORAL ENCEPHALOCELE: ICD-10-CM

## 2020-06-08 DIAGNOSIS — H90.A31 MIXED CONDUCTIVE AND SENSORINEURAL HEARING LOSS OF RIGHT EAR WITH RESTRICTED HEARING OF LEFT EAR: Primary | ICD-10-CM

## 2020-06-08 DIAGNOSIS — G47.33 OSA ON CPAP: ICD-10-CM

## 2020-06-08 DIAGNOSIS — H90.6 MIXED HEARING LOSS, BILATERAL: Primary | ICD-10-CM

## 2020-06-08 PROCEDURE — 1159F MED LIST DOCD IN RCRD: CPT | Mod: S$GLB,,, | Performed by: ORTHOPAEDIC SURGERY

## 2020-06-08 PROCEDURE — 99214 OFFICE O/P EST MOD 30 MIN: CPT | Mod: S$GLB,,, | Performed by: ORTHOPAEDIC SURGERY

## 2020-06-08 PROCEDURE — 99214 PR OFFICE/OUTPT VISIT, EST, LEVL IV, 30-39 MIN: ICD-10-PCS | Mod: S$GLB,,, | Performed by: ORTHOPAEDIC SURGERY

## 2020-06-08 PROCEDURE — 3075F PR MOST RECENT SYSTOLIC BLOOD PRESS GE 130-139MM HG: ICD-10-PCS | Mod: CPTII,S$GLB,, | Performed by: ORTHOPAEDIC SURGERY

## 2020-06-08 PROCEDURE — 3075F SYST BP GE 130 - 139MM HG: CPT | Mod: CPTII,S$GLB,, | Performed by: ORTHOPAEDIC SURGERY

## 2020-06-08 PROCEDURE — 1125F PR PAIN SEVERITY QUANTIFIED, PAIN PRESENT: ICD-10-PCS | Mod: S$GLB,,, | Performed by: ORTHOPAEDIC SURGERY

## 2020-06-08 PROCEDURE — 92567 PR TYMPA2METRY: ICD-10-PCS | Mod: S$GLB,,, | Performed by: AUDIOLOGIST-HEARING AID FITTER

## 2020-06-08 PROCEDURE — 99999 PR PBB SHADOW E&M-EST. PATIENT-LVL IV: CPT | Mod: PBBFAC,,, | Performed by: ORTHOPAEDIC SURGERY

## 2020-06-08 PROCEDURE — 99999 PR PBB SHADOW E&M-EST. PATIENT-LVL IV: ICD-10-PCS | Mod: PBBFAC,,, | Performed by: ORTHOPAEDIC SURGERY

## 2020-06-08 PROCEDURE — 92557 PR COMPREHENSIVE HEARING TEST: ICD-10-PCS | Mod: S$GLB,,, | Performed by: AUDIOLOGIST-HEARING AID FITTER

## 2020-06-08 PROCEDURE — 3078F DIAST BP <80 MM HG: CPT | Mod: CPTII,S$GLB,, | Performed by: ORTHOPAEDIC SURGERY

## 2020-06-08 PROCEDURE — 1159F PR MEDICATION LIST DOCUMENTED IN MEDICAL RECORD: ICD-10-PCS | Mod: S$GLB,,, | Performed by: ORTHOPAEDIC SURGERY

## 2020-06-08 PROCEDURE — 92567 TYMPANOMETRY: CPT | Mod: S$GLB,,, | Performed by: AUDIOLOGIST-HEARING AID FITTER

## 2020-06-08 PROCEDURE — 3078F PR MOST RECENT DIASTOLIC BLOOD PRESSURE < 80 MM HG: ICD-10-PCS | Mod: CPTII,S$GLB,, | Performed by: ORTHOPAEDIC SURGERY

## 2020-06-08 PROCEDURE — 92557 COMPREHENSIVE HEARING TEST: CPT | Mod: S$GLB,,, | Performed by: AUDIOLOGIST-HEARING AID FITTER

## 2020-06-08 PROCEDURE — 1125F AMNT PAIN NOTED PAIN PRSNT: CPT | Mod: S$GLB,,, | Performed by: ORTHOPAEDIC SURGERY

## 2020-06-08 RX ORDER — CELECOXIB 200 MG/1
1 CAPSULE ORAL DAILY
COMMUNITY
Start: 2020-06-03

## 2020-06-08 RX ORDER — HYDROCHLOROTHIAZIDE 12.5 MG/1
2 CAPSULE ORAL 2 TIMES DAILY
COMMUNITY
Start: 2020-04-23

## 2020-06-08 RX ORDER — MUPIROCIN 20 MG/G
OINTMENT TOPICAL 2 TIMES DAILY
COMMUNITY
Start: 2020-04-06 | End: 2021-02-19

## 2020-06-08 RX ORDER — ACEBUTOLOL HYDROCHLORIDE 200 MG/1
1 CAPSULE ORAL NIGHTLY
COMMUNITY
Start: 2020-03-23

## 2020-06-08 RX ORDER — SEMAGLUTIDE 1.34 MG/ML
INJECTION, SOLUTION SUBCUTANEOUS WEEKLY
COMMUNITY
Start: 2020-05-26

## 2020-06-08 NOTE — PROGRESS NOTES
Subjective:       Patient ID: Jeb Michele is a 69 y.o. male.    Chief Complaint: 1 year follow up; Otalgia (right ear (for the past month)); and Needs hearing aid clearance    Patient is a very pleasant 68 year old gentleman here to see me today in followup for evaluation of multiple issues.  Overall, he is very pleased with his progress and says that he has not had any issues with sinusitis since starting his regimen of saline spray and bactroban.  He is continuing to use mupirocin to his nose twice daily, and he has not had any facial pain or epistaxis.  He is on CPAP and it is humidified.  He has not required any antibiotics over the last six months.  He also has a known TM perforation, and he has not had ear drainage.  He has noted some pain in the right ear for the last week.  He has continued hearing loss, and he is now interested in hearing aids.  He feels that his right ear hearing is worse than last year.  He has a history of an encephalocele repair with Dr. Acuna in 2010, and says that he is having similar feelings at this time.    Review of Systems   Constitutional: Negative for fatigue, fever and unexpected weight change.   HENT: Positive for hearing loss. Negative for congestion, ear discharge, ear pain, facial swelling, nosebleeds, postnasal drip, rhinorrhea, sinus pressure, sneezing, sore throat, tinnitus, trouble swallowing and voice change.    Eyes: Negative for discharge, redness and itching.   Respiratory: Negative for cough, choking, shortness of breath and wheezing.    Cardiovascular: Negative for chest pain and palpitations.   Gastrointestinal: Negative for abdominal pain.        No reflux.   Musculoskeletal: Negative for neck pain.   Neurological: Negative for dizziness, facial asymmetry, light-headedness and headaches.   Hematological: Negative for adenopathy. Does not bruise/bleed easily.   Psychiatric/Behavioral: Negative for agitation, behavioral problems, confusion and decreased  concentration.       Objective:      Physical Exam   Constitutional: He is oriented to person, place, and time. Vital signs are normal. He appears well-developed and well-nourished. No distress.   HENT:   Head: Normocephalic and atraumatic.   Right Ear: Hearing, external ear and ear canal normal. Tympanic membrane is perforated, no drainage, middle ear mucosa healthy. No middle ear effusion.   Left Ear: Hearing, external ear and ear canal normal. Tympanic membrane is scarred and retracted.  No middle ear effusion.   Nose: Mucosal edema and rhinorrhea (thick, dry drainage bilaterally) present. No nasal deformity or septal deviation.   Mouth/Throat: Uvula is midline, oropharynx is clear and moist and mucous membranes are normal. Oral lesions: previous UP3. No trismus in the jaw. Normal dentition. No uvula swelling. No oropharyngeal exudate or posterior oropharyngeal edema.    Eyes: Pupils are equal, round, and reactive to light. Conjunctivae and EOM are normal. Right eye exhibits no chemosis. Left eye exhibits no chemosis. Right conjunctiva is not injected. Left conjunctiva is not injected. No scleral icterus.   Neck: Trachea normal and phonation normal. No tracheal tenderness present. No tracheal deviation present. No thyroid mass and no thyromegaly present.   Cardiovascular: Intact distal pulses.   Pulmonary/Chest: Effort normal. No accessory muscle usage or stridor. No respiratory distress.   Lymphadenopathy:        Head (right side): No submental, no submandibular, no preauricular and no posterior auricular adenopathy present.        Head (left side): No submental, no submandibular, no preauricular and no posterior auricular adenopathy present.     He has no cervical adenopathy.        Right cervical: No superficial cervical and no deep cervical adenopathy present.       Left cervical: No superficial cervical and no deep cervical adenopathy present.   Neurological: He is alert and oriented to person, place, and  time. No cranial nerve deficit.   Skin: Skin is warm and dry. No rash noted. No erythema.   Psychiatric: He has a normal mood and affect. His behavior is normal. Thought content normal.           Assessment:       1. Mixed hearing loss, bilateral    2. Temporal encephalocele    3. Tympanic membrane perforation, right    4. MARJORIE on CPAP        Plan:       1.  Right TM perforation: Stable, not interested in surgical repair at this time.  Dry ear precautions.  2.  History of right temporal lobe encephalocele repair:  He has a history of a right temporal lobe encephalocele repair about 10 years ago, and is now having pain and pressure in his right ear, which he says was his presenting symptoms previously.  I recommended both an MRI and a CT of the temporal bone/IAC, he is refusing MRI due to inability to tolerate (discussed open MRI and medication).  He agreed to CT, will order and call with results.  Repeat audiogram today.  2.  Mixed hearing loss:  Repeat audiogram today, he is a candidate for hearing aids and is cleared when he is ready.  We also discussed that he is a excellent candidate for hearing aids, if and when he the patient is motivated.  He was given handouts with information and pricing of hearing aids, and will contact audiology when ready to proceed.  We also discussed the use hearing protection when exposed to loud noise, including lawn equipment.   3. MARJORIE on CPAP:  Tolerating much better with topical bactroban, less nasal and mouth dryness.  4.  Epistaxis:  Resolved, continue with bactroban twice daily, RTC 1 year sooner if issues arise.

## 2020-06-08 NOTE — PROGRESS NOTES
Referring provider: Dr. Amilcar Michele Sr. was seen 06/08/2020 for an audiological evaluation.  Patient complains of worsening hearing in the right ear; history of mixed hearing loss AD and SNHL AS.   He also has a known TM perforation, and he has not had ear drainage.  He has noted some pain in the right ear for the last week.  He has continued hearing loss, and he is now interested in hearing aids.  He feels that his right ear hearing is worse than last year.  He has a history of an encephalocele repair with Dr. Acuna in 2010, and says that he is having similar feelings at this time.    Results reveal a moderate-to-profound mixed hearing loss 250-8000 Hz for the right ear, and a mild-to-profound sensorineural hearing loss 250-8000 Hz for the left ear.   Speech Reception Thresholds were 45 dBHL for the right ear and 30 dBHL for the left ear.   Word recognition scores were excellent for the right ear and good for the left ear.   Tympanograms were unable to obtain, tympanic membrane perforation for the right ear and Type Ad, hypermobile for the left ear.    Patient was counseled on the above findings.    Recommendations:  1. ENT  2. Hearing aids, binaural. Patient was provided a copy of today's and last year's audiograms; and was notified about [employer].

## 2021-07-19 ENCOUNTER — CLINICAL SUPPORT (OUTPATIENT)
Dept: AUDIOLOGY | Facility: CLINIC | Age: 71
End: 2021-07-19
Payer: MEDICARE

## 2021-07-19 ENCOUNTER — OFFICE VISIT (OUTPATIENT)
Dept: OTOLARYNGOLOGY | Facility: CLINIC | Age: 71
End: 2021-07-19
Payer: MEDICARE

## 2021-07-19 VITALS
RESPIRATION RATE: 16 BRPM | HEIGHT: 74 IN | BODY MASS INDEX: 31.72 KG/M2 | WEIGHT: 247.13 LBS | DIASTOLIC BLOOD PRESSURE: 69 MMHG | TEMPERATURE: 98 F | HEART RATE: 85 BPM | SYSTOLIC BLOOD PRESSURE: 134 MMHG

## 2021-07-19 DIAGNOSIS — H72.91 TYMPANIC MEMBRANE PERFORATION, RIGHT: ICD-10-CM

## 2021-07-19 DIAGNOSIS — H72.91 PERFORATION OF RIGHT TYMPANIC MEMBRANE: ICD-10-CM

## 2021-07-19 DIAGNOSIS — H90.A31 MIXED CONDUCTIVE AND SENSORINEURAL HEARING LOSS OF RIGHT EAR WITH RESTRICTED HEARING OF LEFT EAR: Primary | ICD-10-CM

## 2021-07-19 PROCEDURE — 92557 COMPREHENSIVE HEARING TEST: CPT | Mod: S$GLB,,, | Performed by: AUDIOLOGIST-HEARING AID FITTER

## 2021-07-19 PROCEDURE — 99999 PR PBB SHADOW E&M-EST. PATIENT-LVL IV: CPT | Mod: PBBFAC,,, | Performed by: ORTHOPAEDIC SURGERY

## 2021-07-19 PROCEDURE — 92567 TYMPANOMETRY: CPT | Mod: S$GLB,,, | Performed by: AUDIOLOGIST-HEARING AID FITTER

## 2021-07-19 PROCEDURE — 99213 OFFICE O/P EST LOW 20 MIN: CPT | Mod: S$GLB,,, | Performed by: ORTHOPAEDIC SURGERY

## 2021-07-19 PROCEDURE — 99999 PR PBB SHADOW E&M-EST. PATIENT-LVL IV: ICD-10-PCS | Mod: PBBFAC,,, | Performed by: ORTHOPAEDIC SURGERY

## 2021-07-19 PROCEDURE — 92567 PR TYMPA2METRY: ICD-10-PCS | Mod: S$GLB,,, | Performed by: AUDIOLOGIST-HEARING AID FITTER

## 2021-07-19 PROCEDURE — 92557 PR COMPREHENSIVE HEARING TEST: ICD-10-PCS | Mod: S$GLB,,, | Performed by: AUDIOLOGIST-HEARING AID FITTER

## 2021-07-19 PROCEDURE — 99213 PR OFFICE/OUTPT VISIT, EST, LEVL III, 20-29 MIN: ICD-10-PCS | Mod: S$GLB,,, | Performed by: ORTHOPAEDIC SURGERY

## 2021-08-03 ENCOUNTER — TELEPHONE (OUTPATIENT)
Dept: OTOLARYNGOLOGY | Facility: CLINIC | Age: 71
End: 2021-08-03

## 2021-08-03 RX ORDER — CEFDINIR 300 MG/1
300 CAPSULE ORAL 2 TIMES DAILY
Qty: 20 CAPSULE | Refills: 0 | Status: SHIPPED | OUTPATIENT
Start: 2021-08-03 | End: 2021-08-13

## 2021-08-03 RX ORDER — OFLOXACIN 3 MG/ML
3 SOLUTION AURICULAR (OTIC) 2 TIMES DAILY
Qty: 5 ML | Refills: 0 | Status: SHIPPED | OUTPATIENT
Start: 2021-08-03 | End: 2021-08-13